# Patient Record
(demographics unavailable — no encounter records)

---

## 2025-01-28 NOTE — CHIEF COMPLAINT
[FreeTextEntry1] : Janis is a 52 y/o  who presents today with c/o increased vaginal discharge and fishy odor. She has been treated for BV in the past.  PT also has c/o inability to orgasm and little sensation in the vaginal area. She is currently on topical estrogen and oral progesterone and has noticed some improvement in sleep and pain but will be following up with rheum as pain is still an issue. We reviewed the possible benefits of both topical and vaginal testosterone therapy,t hat it may take 13-14 weeks to notice any improvement and that changes will not bring her back to her teen self.

## 2025-01-28 NOTE — PHYSICAL EXAM
[Labia Majora] : labia major [Labia Minora] : labia minora [Normal] : clitoris [FreeTextEntry4] : +discharge

## 2025-02-03 NOTE — HISTORY OF PRESENT ILLNESS
[FreeTextEntry1] : LEVAR CHAN is a 48 year old woman who presents for evaluation of mild ESR/CRP elevation on recent b/w with PMD in setting of below sx:  + chronic fatigue - + snoring, + restless -- never had sleep study + skin hypersensitivity + severe depression/anxiety, no SI/HI -- on Cymbalta 60mg, has been trying to cut down but unable to + myalgias  + chronic fluctuating constipation and diarrhea -- GI dx IBS, colonoscopy/ EGD normal  + hot thyroid nodule -- removed 3 years ago + chronically severely dry mouth with loss of many teeth, no salivary swelling + intermittent dry eyes  + vaginal dryness  + ?mild Raynaud's, no threatened digits + CTS, mild  + TTP over R GTB and upper buttock area -- no pain in L spine, no pain in groin -- 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 No miscarriages, no HNT/eclampsia with pregnancies.  FH - cousin with FMS, uncle with Celiacs   Labs - ESR/CRP elevated, HBA1c 6 Prior EBV, Lyme negative, RF/CC, RENATO/SLE serologies, IgG4, histone   -------------- 7/13/20 -- Cymbalta increased dose helping with mood, no SE, continued fatigue and sicca. GTB injection also helped with pain in that location. Has not been able to make appt to see sleep as yet. No other complaints today. No synovitis, painful joints, rashes, F/C, CP, SOB, SALAZAR.   10/19/20 -- Mood remains improved, tolerating Cymbalta 60. Sleep remains very poor, no issues falling asleep, but issues staying asleep. High level of anxiety overall despite Cymbalta. Sicca stable, not worsening, no other CTD sx or inflammatory arthritis sx. No F/C, infectious sx.   12/21/20 -- Worsening R sided pelvic/low back pain, milder on L side, worse with ambulation, no radicular pain, no dysuria. Epistaxis s/p Covid testing late Nov, now with pain at top of L nares but no further bleeding, will be seeing ENT. No other sinus issues. FMS sx unchanged though Cymbalta is still helping.    3/1/21 -- 2 days of diffuse arthralgias, soft tissue swelling. Has been off Metformin x 1 week, HCTZ x 3 days. no F/C, infectious sx, no sick contacts, [place] Pruritic rash on back as Nov, derm gave her topicals, initial area improved but new areas locally. No other areas of rash. FMS sx unchanged from last time. A few episodes of RLS on R side.   5/24/21 -- Ongoing fatigue and arthralgias but has been stable. No further rashes, remains without synovitis. No recent infectious sx. Reports thyroid fxn might have been off recently but not sure, remains on same dose of Synthroid. Periods are irregular.   8/30/21 -- Mood slightly improved, still with fatigue and arthralgias but feels these are slightly improved too, tho more lower back pain, no radiation. Has discontinued Cymbalta which might be helping but now reporting daily crying s/p tapering off. Also getting thyroid levels adjusted. Sicca stable, no other sx.   12/20/21 -- Overall stable mood from last, less crying, feels that fatigue remains somewhat improved as are arthralgias. Worsening ability to eat food 2/2 her tooth loss, has lost approx 30 lbs in last 9 months 2/2 decreased ability to eat without pain, would like to get implants but insurance not covering, remains with significant oral sicca.   3/21/22 -- Overall stable but with ongoing mood and fatigue sx. Ongoing/progressive Raynaud's, has been trying conservative measures and pocket warmers without improvement, remains very active when she has to be outside for work. Approx 10lb weight gain without increased PO intake. Saw neurosx for LBP.   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 Ongoing difficulty with eating 2/2 poor dentition, has not been able to address 2/2 finances. DEXA normal. Worsening hand pain and some nonspecific swelling, did hurt R hand recently.   2/14/23 -- Urgent visit, recent UC/ER eval for acute onset R L spine pain/spasm, no preceding trauma, no current radicular sx, no prior episodes of similar. Sicca and Raynaud's stable. Prior hand pain has improved, no peripheral synovitis. FMS sx worse in setting of above.   5/2/23 -- Started PT, not sure its helping yet, steroids helped diminish pain, has returned over the weekend since being off them, no SE. Sicca and Raynaud's stable, no peripheral joint sx. Mood better with higher dose of Wellbutrin as is FMS pain.   8/14/23 -- Ongoing but stable fatigue levels, FMS pain, Raynaud's, sicca sx. Wellbutrin helping with mood but increased stress recently, denies SI/HI. Ongoing but markedly milder back pain, has completed PT and doing HEP as well as short walks which are helping. Has not been able to get insurance coverage for her dental work, doesn't have the finances to be able to afford it at present, remains limited in types of foods she can eat 2/2 this.   11/27/23 -- Recent recurrence of L spine radicular pain, about 70% improved with steroid taper and muscle relaxer. + gelling but no synovitis, Endo encouraged her to try to increase light exercise for weight loss, starting to become perimenopausal with mild sx. Raynaud's stable, mood stable. + L shoulder pain x few days, slowly improving.   2/3/25 -- Last seen as above, stopped Wellbutrin midway thru last year, she isn't sure why. Worsening diffuse arthralgias since starting menopause, presently on HRT with GYN but not sure its helping with this, ongoing issues with mood, sexual dysfunction, ADLs 2/2 pain, sleep interruption/non-restive sleep. PHQ screening +, denies SI/HI but has hx of chronic depression/dysthymia, has not been able to find psychiatrist or talk therapist to date since bad experience with psych remotely. Raynaud's stable, remainder of CTD ROS/inflammatory arthritis ROS negative. Does note even low dose prednisone helps with pain and fatigue.

## 2025-02-03 NOTE — REASON FOR VISIT
[Follow-Up: _____] : a [unfilled] follow-up visit [FreeTextEntry1] : ESR/CRP elevated, sicca, fatigue, fibromyalgia, low back pain, raynauds

## 2025-02-03 NOTE — PHYSICAL EXAM
[General Appearance - Alert] : alert [General Appearance - In No Acute Distress] : in no acute distress [General Appearance - Well Nourished] : well nourished [Sclera] : the sclera and conjunctiva were normal [PERRL With Normal Accommodation] : pupils were equal in size, round, and reactive to light [Extraocular Movements] : extraocular movements were intact [Outer Ear] : the ears and nose were normal in appearance [Nasal Cavity] : the nasal mucosa and septum were normal [Neck Appearance] : the appearance of the neck was normal [] : no respiratory distress [Edema] : there was no peripheral edema [Abnormal Walk] : normal gait [Nail Clubbing] : no clubbing  or cyanosis of the fingernails [Musculoskeletal - Swelling] : no joint swelling seen [Motor Tone] : muscle strength and tone were normal [Motor Exam] : the motor exam was normal [No Focal Deficits] : no focal deficits [Oriented To Time, Place, And Person] : oriented to person, place, and time [Impaired Insight] : insight and judgment were intact [Respiration, Rhythm And Depth] : normal respiratory rhythm and effort [No Spinal Tenderness] : no spinal tenderness [FreeTextEntry1] : Flat affect

## 2025-02-03 NOTE — ASSESSMENT
[FreeTextEntry1] : LEVAR CHAN is a 51 year old woman with + ESR/CRP in setting of severe fatigue, advanced oral sicca, chronic vaginal sicca, intermittent ocular sicca, + snoring and inability to stay asleep -- possible component of TOMA or interrupted sleep patterns causing fatigue. + anxiety ongoing, depressive sx have improved with Cymbalta but also very sedating for her, multiple sx consistent with FMS. Full rheumatologic serologies negative.  # clinical sx of sicca -- ?age related vs drug induced  - c/w symptomatic treatment for now as has no other CTD sx concerning for Sjogrens or lupus - working on getting implants as deemed medically necessary in setting as above -- on hold 2/2 financial burden - c/w PO hydration, sugar free lozenges, Biotene, dentist eval q6 months  - c/w prn preservative free eye drops q6 hours - c/w use of vaginal lubrication for sexual intercourse  # FMS, LBP with radicular sx -- recurrent, responsive to low dose prednisone and muscle relaxers  # severe concomitant mood issues - suspect dysthymia >depression given longevity  # poor sleep # all above exacerbated by menopause  - c/w PRN methocarbamol as helping more than flexeril  - resume prednisone 2.5mg/day, PRN 10mg for severe days but use latter sparingly. Discussed <5mg/day is below physiologic dose so safe to use with close monitoring  - c/w tylenol and use more consistently  - c/w stretching, light aerobic exercises, massage, heat as adjuncts for FMS pain - resume Wellbutrin - 300mg EOD x 2 weeks then daily  - Sleep hygiene reviewed - Will try to find psychiatrist when new insurance kicks in, advised I think medication management will be more effective for her than talk therapy at present given chronic mood symptoms.  Patient verbalized understanding.  # Raynaud's, remains mild  - c/w conservative measures alone for now as no threatened digits   # bone health 2/2 menopause and steroid use  - check DEXA - reviewed recommendations for dietary Ca 600mg BID with food, supplemental Vit D 1000 IU daily - Increase weight bearing exercise for goal of 30min 3-4x/week to maintain BMD as tolerated - modalities reviewed with patient   RTC in 4 months, sooner if new/worsening sx. Tasked other office to help set up CPE with PMD as well

## 2025-02-03 NOTE — REVIEW OF SYSTEMS
[Feeling Tired] : feeling tired [Dry Eyes] : dryness of the eyes [Arthralgias] : arthralgias [Joint Pain] : joint pain [Joint Stiffness] : joint stiffness [As Noted in HPI] : as noted in HPI [Sleep Disturbances] : sleep disturbances [Anxiety] : anxiety [Depression] : depression [Negative] : Neurological [Joint Swelling] : no joint swelling [Suicidal] : not suicidal

## 2025-02-19 NOTE — ASSESSMENT
[FreeTextEntry1] : Therapeutic options and their risks and benefits; along with multiple diagnostic possibilities were discussed at length; risks and benefits of further study were discussed;   The patient was instructed to check portal and/or call the office in one week for biopsy results.   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

## 2025-02-19 NOTE — HISTORY OF PRESENT ILLNESS
[de-identified] : Pt. presents for skin check; c/o few spots of concern;  itchy spot on back, changing lesion on left lower cheek Severity:  mild   Modifying factors:  none Associated symptoms:  none Context:  no association with activity  Also rash, itching, on chest, abdomen-  worse over last few weeks-  has used steroid cream in past;

## 2025-02-19 NOTE — PHYSICAL EXAM
[Full Body Skin Exam Performed] : performed [FreeTextEntry3] : Skin examination performed of the face, neck, trunk, arms, legs;  The patient is well, alert and oriented, pleasant and cooperative. Eyelids, conjunctivae, oral mucosa, digits and nails all normal.   No cervical adenopathy.  Normal findings include:  Seborrheic keratoses- L chest, L shoulder  excoriated inflamed papule mid upper back near neck  pigmented lesion with irregular color and border; left lower cheek . Angiomas Lentigines

## 2025-03-25 NOTE — HISTORY OF PRESENT ILLNESS
[FreeTextEntry1] : Requests comprehensive exam [de-identified] : Presents for comprehensive exam.  Following with all specialists - to make F/U appointment with Cardiology.  Reviewed age-appropriate screening.  States trying to watch diet and increase activity with goal of weight loss.

## 2025-03-25 NOTE — HEALTH RISK ASSESSMENT
[Yes] : Yes [2 - 4 times a month (2 pts)] : 2-4 times a month (2 points) [1 or 2 (0 pts)] : 1 or 2 (0 points) [Never (0 pts)] : Never (0 points) [No] : In the past 12 months have you used drugs other than those required for medical reasons? No [0] : 2) Feeling down, depressed, or hopeless: Not at all (0) [Former] : Former [> 15 Years] : > 15 Years [Patient reported colonoscopy was normal] : Patient reported colonoscopy was normal [HIV test declined] : HIV test declined [Hepatitis C test declined] : Hepatitis C test declined [With Patient/Caregiver] : , with patient/caregiver [Reviewed no changes] : Reviewed, no changes [Audit-CScore] : 2 [[id number]] : 0 [ColonoscopyDate] : 06/23 [AdvancecareDate] : 03/25

## 2025-03-25 NOTE — COUNSELING
[Benefits of weight loss discussed] : Benefits of weight loss discussed [Encouraged to increase physical activity] : Encouraged to increase physical activity [de-identified] : Healthy eating and activities  [None] : None [Good understanding] : Patient has a good understanding of lifestyle changes and steps needed to achieve self management goal

## 2025-04-11 NOTE — HISTORY OF PRESENT ILLNESS
[FreeTextEntry1] : Janis Gonzalez is a 51-year-old woman with a history of prediabetes, thyroid adenoma status post thyroid lobectomy, hypothyroidism, obesity, hypertension (with ACE-I associated cough), parathyroidectomy 2/2023 who returns for cardiac examination -She was last seen in the office December 2023.    She denies cp, sob, palpitations.  She reports occ positional lightheadedness.  She notes HA x 5 days.  She is an  and work has been busy and stressful.  The "tingling" sensation in her distal fingertips have resolved.  She is concerned because her PCP wants her to start statin for elevated cholesterol which she is reluctant to do.

## 2025-04-11 NOTE — CARDIOLOGY SUMMARY
[de-identified] : 4/11/25: NALLELY 12/29/2023. Sinus rhythm [de-identified] : 9/19/16, no Ischemia, no exercise induced arrhythmias,    [de-identified] : 12/2024: NL LVF, Mild AI  4/2/14, Normal mitral valve, minimal mitral regurgitation, normal trileaflet aortic valve, normal left ventricular size and function with no segmental wall motion abnormalities, normal estimated pulmonary artery systolic pressure, normal tricuspid valve, mild tricuspid regurgitation LVEF 55-69%.

## 2025-04-11 NOTE — DISCUSSION/SUMMARY
[With Me] : with me [___ Year(s)] : in [unfilled] year(s) [EKG obtained to assist in diagnosis and management of assessed problem(s)] : EKG obtained to assist in diagnosis and management of assessed problem(s) [FreeTextEntry1] : Hypertension: Controlled; continue valsartan and HCTZ.  Tingling in fingertips (both hands and all fingers); resolved.  Hypokalemia: resolved  Prediabetes: There is a history of prediabetes and metformin use which she is no longer taking.  Recent A1C 6.0.  Pt will follow with endocrinologist  HPL: Recent .  10 year ASCVD risk 2.5%.  Pt is reluctant to take statin medication.  She is agreeable to a heart calcium score as she has risk factors for CAD.  I will contact her with results  Pt will follow up with  in 6 months

## 2025-04-11 NOTE — REVIEW OF SYSTEMS
[Dizziness] : dizziness [Headache] : headache [Weight Gain (___ Lbs)] : [unfilled] ~Ulb weight gain [Feeling Fatigued] : feeling fatigued [Negative] : Genitourinary [SOB] : no shortness of breath [Dyspnea on exertion] : not dyspnea during exertion [Chest Discomfort] : no chest discomfort [Lower Ext Edema] : no extremity edema [Leg Claudication] : no intermittent leg claudication [Palpitations] : no palpitations [Orthopnea] : no orthopnea [PND] : no PND [Syncope] : no syncope [FreeTextEntry9] : Low back pain

## 2025-04-11 NOTE — PHYSICAL EXAM
[No Acute Distress] : no acute distress [Obese] : obese [Normal S1, S2] : normal S1, S2 [No Murmur] : no murmur [Clear Lung Fields] : clear lung fields [Normal Gait] : normal gait [No Edema] : no edema [Alert and Oriented] : alert and oriented [Well Developed] : well developed [Soft] : abdomen soft [No Rash] : no rash [Moves all extremities] : moves all extremities [de-identified] : Pupils round [de-identified] : Normal capillary refill in nailbeds [de-identified] : No joint or fingernail deformities

## 2025-05-15 NOTE — ASSESSMENT
[FreeTextEntry1] : LEVAR CHAN is a 51 year old woman with + ESR/CRP in setting of severe fatigue, advanced oral sicca, chronic vaginal sicca, intermittent ocular sicca, + snoring and inability to stay asleep -- possible component of TOMA or interrupted sleep patterns causing fatigue. + anxiety ongoing, depressive sx have improved with Cymbalta but also very sedating for her, multiple sx consistent with FMS. Full rheumatologic serologies negative   # clinical sx of sicca -- ?age related vs drug induced  - c/w symptomatic treatment for now as has no other CTD sx concerning for Sjogren's or lupus - working on getting implants as deemed medically necessary in setting as above -- on hold 2/2 financial burden - c/w PO hydration, sugar free lozenges, Biotene, dentist eval q6 months  - c/w prn preservative free eye drops q6 hours - c/w use of vaginal lubrication for sexual intercourse - low suspicion for GCA but monitor HA - March PMD labs stable - repeat labs as below prior to next visit  # FMS, LBP with radicular sx -- recurrent, responsive to low dose prednisone and muscle relaxers  # severe concomitant mood issues - suspect dysthymia >depression given longevity  # poor sleep # all above exacerbated by menopause  - c/w PRN methocarbamol as helping more than flexeril  - c/w prednisone 2.5mg/day, PRN 10mg for severe days but use latter sparingly. Discussed <5mg/day is below physiologic dose so safe to use with close monitoring  - c/w tylenol and use more consistently  - c/w stretching, light aerobic exercises, massage, heat as adjuncts for FMS pain - c/w Wellbutrin 300mg daily  - Sleep hygiene reviewed - Deferring seeking psychiatric care until home stressors improved but aware if significantly worsening mood to seek out psychiatric care either in clinic setting or ER setting.   # Raynaud's, remains mild  - c/w conservative measures alone for now as no threatened digits   # bone health 2/2 menopause and steroid use  - check DEXA - reviewed recommendations for dietary Ca 600mg BID with food, supplemental Vit D 1000 IU daily - Increase weight bearing exercise for goal of 30min 3-4x/week to maintain BMD as tolerated - modalities reviewed with patient   RTC in 4 months, sooner if new/worsening sx.

## 2025-05-15 NOTE — REVIEW OF SYSTEMS
[Feeling Tired] : feeling tired [Dry Eyes] : dryness of the eyes [Arthralgias] : arthralgias [Joint Pain] : joint pain [Joint Stiffness] : joint stiffness [Sleep Disturbances] : sleep disturbances [Anxiety] : anxiety [Depression] : depression [Negative] : Heme/Lymph [Fever] : no fever [Chills] : no chills [Joint Swelling] : no joint swelling [As Noted in HPI] : as noted in HPI [Suicidal] : not suicidal [de-identified] : HA

## 2025-05-15 NOTE — REVIEW OF SYSTEMS
[Feeling Tired] : feeling tired [Dry Eyes] : dryness of the eyes [Arthralgias] : arthralgias [Joint Pain] : joint pain [Joint Stiffness] : joint stiffness [Sleep Disturbances] : sleep disturbances [Anxiety] : anxiety [Depression] : depression [Negative] : Heme/Lymph [Fever] : no fever [Chills] : no chills [Joint Swelling] : no joint swelling [As Noted in HPI] : as noted in HPI [Suicidal] : not suicidal [de-identified] : HA

## 2025-05-15 NOTE — HISTORY OF PRESENT ILLNESS
[FreeTextEntry1] : LEVAR CHAN is a 48 year old woman who presents for evaluation of mild ESR/CRP elevation on recent b/w with PMD in setting of below sx:  + chronic fatigue - + snoring, + restless -- never had sleep study + skin hypersensitivity + severe depression/anxiety, no SI/HI -- on Cymbalta 60mg, has been trying to cut down but unable to + myalgias  + chronic fluctuating constipation and diarrhea -- GI dx IBS, colonoscopy/ EGD normal  + hot thyroid nodule -- removed 3 years ago + chronically severely dry mouth with loss of many teeth, no salivary swelling + intermittent dry eyes  + vaginal dryness  + ?mild Raynaud's, no threatened digits + CTS, mild  + TTP over R GTB and upper buttock area -- no pain in L spine, no pain in groin -- 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 No miscarriages, no HNT/eclampsia with pregnancies.  FH - cousin with FMS, uncle with Celiacs   Labs - ESR/CRP elevated, HBA1c 6 Prior EBV, Lyme negative, RF/CC, RENATO/SLE serologies, IgG4, histone   -------------- 7/13/20 -- Cymbalta increased dose helping with mood, no SE, continued fatigue and sicca. GTB injection also helped with pain in that location. Has not been able to make appt to see sleep as yet. No other complaints today. No synovitis, painful joints, rashes, F/C, CP, SOB, SALAZAR.   10/19/20 -- Mood remains improved, tolerating Cymbalta 60. Sleep remains very poor, no issues falling asleep, but issues staying asleep. High level of anxiety overall despite Cymbalta. Sicca stable, not worsening, no other CTD sx or inflammatory arthritis sx. No F/C, infectious sx.   12/21/20 -- Worsening R sided pelvic/low back pain, milder on L side, worse with ambulation, no radicular pain, no dysuria. Epistaxis s/p Covid testing late Nov, now with pain at top of L nares but no further bleeding, will be seeing ENT. No other sinus issues. FMS sx unchanged though Cymbalta is still helping.    3/1/21 -- 2 days of diffuse arthralgias, soft tissue swelling. Has been off Metformin x 1 week, HCTZ x 3 days. no F/C, infectious sx, no sick contacts[place] Pruritic rash on back as Nov, derm gave her topicals, initial area improved but new areas locally. No other areas of rash. FMS sx unchanged from last time. A few episodes of RLS on R side.   5/24/21 -- Ongoing fatigue and arthralgias but has been stable. No further rashes, remains without synovitis. No recent infectious sx. Reports thyroid fxn might have been off recently but not sure, remains on same dose of Synthroid. Periods are irregular.   8/30/21 -- Mood slightly improved, still with fatigue and arthralgias but feels these are slightly improved too, tho more lower back pain, no radiation. Has discontinued Cymbalta which might be helping but now reporting daily crying s/p tapering off. Also getting thyroid levels adjusted. Sicca stable, no other sx.   12/20/21 -- Overall stable mood from last, less crying, feels that fatigue remains somewhat improved as are arthralgias. Worsening ability to eat food 2/2 her tooth loss, has lost approx 30 lbs in last 9 months 2/2 decreased ability to eat without pain, would like to get implants but insurance not covering, remains with significant oral sicca.   3/21/22 -- Overall stable but with ongoing mood and fatigue sx. Ongoing/progressive Raynaud's, has been trying conservative measures and pocket warmers without improvement, remains very active when she has to be outside for work. Approx 10lb weight gain without increased PO intake. Saw neurosx for LBP.   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 Ongoing difficulty with eating 2/2 poor dentition, has not been able to address 2/2 finances. DEXA normal. Worsening hand pain and some nonspecific swelling, did hurt R hand recently.   2/14/23 -- Urgent visit, recent UC/ER eval for acute onset R L spine pain/spasm, no preceding trauma, no current radicular sx, no prior episodes of similar. Sicca and Raynaud's stable. Prior hand pain has improved, no peripheral synovitis. FMS sx worse in setting of above.   5/2/23 -- Started PT, not sure its helping yet, steroids helped diminish pain, has returned over the weekend since being off them, no SE. Sicca and Raynaud's stable, no peripheral joint sx. Mood better with higher dose of Wellbutrin as is FMS pain.   8/14/23 -- Ongoing but stable fatigue levels, FMS pain, Raynaud's, sicca sx. Wellbutrin helping with mood but increased stress recently, denies SI/HI. Ongoing but markedly milder back pain, has completed PT and doing HEP as well as short walks which are helping. Has not been able to get insurance coverage for her dental work, doesn't have the finances to be able to afford it at present, remains limited in types of foods she can eat 2/2 this.   11/27/23 -- Recent recurrence of L spine radicular pain, about 70% improved with steroid taper and muscle relaxer. + gelling but no synovitis, Endo encouraged her to try to increase light exercise for weight loss, starting to become perimenopausal with mild sx. Raynaud's stable, mood stable. + L shoulder pain x few days, slowly improving.   2/3/25 -- Last seen as above, stopped Wellbutrin midway thru last year, she isn't sure why. Worsening diffuse arthralgias since starting menopause, presently on HRT with GYN but not sure its helping with this, ongoing issues with mood, sexual dysfunction, ADLs 2/2 pain, sleep interruption/non-restive sleep. PHQ screening +, denies SI/HI but has hx of chronic depression/dysthymia, has not been able to find psychiatrist or talk therapist to date since bad experience with psych remotely. Raynaud's stable, remainder of CTD ROS/inflammatory arthritis ROS negative. Does note even low dose prednisone helps with pain and fatigue.   5/12/25 -- Prednisone helping with arthralgia, no SE. Raynaud's and sicca stable, remainder of CTD ROS negative. Taking Wellbutrin, still with significant mood issues, 2/2 under a lot of stress, denies SI/HI, hasn't been able to find talk therapist, not interested in adding on further meds. Stress related HA, GCA ROS negative.

## 2025-05-15 NOTE — PHYSICAL EXAM
[General Appearance - Alert] : alert [General Appearance - In No Acute Distress] : in no acute distress [General Appearance - Well Nourished] : well nourished [Sclera] : the sclera and conjunctiva were normal [PERRL With Normal Accommodation] : pupils were equal in size, round, and reactive to light [Extraocular Movements] : extraocular movements were intact [Outer Ear] : the ears and nose were normal in appearance [Nasal Cavity] : the nasal mucosa and septum were normal [Neck Appearance] : the appearance of the neck was normal [] : no respiratory distress [Respiration, Rhythm And Depth] : normal respiratory rhythm and effort [Edema] : there was no peripheral edema [No Spinal Tenderness] : no spinal tenderness [Abnormal Walk] : normal gait [Nail Clubbing] : no clubbing  or cyanosis of the fingernails [Musculoskeletal - Swelling] : no joint swelling seen [Motor Tone] : muscle strength and tone were normal [Motor Exam] : the motor exam was normal [No Focal Deficits] : no focal deficits [Oriented To Time, Place, And Person] : oriented to person, place, and time [Impaired Insight] : insight and judgment were intact [No CVA Tenderness] : no ~M costovertebral angle tenderness [FreeTextEntry1] : non specific erythematous rash on forehead/cheeks, chin --stable, no active raynauds today